# Patient Record
Sex: MALE | Race: WHITE | NOT HISPANIC OR LATINO | Employment: OTHER | URBAN - METROPOLITAN AREA
[De-identification: names, ages, dates, MRNs, and addresses within clinical notes are randomized per-mention and may not be internally consistent; named-entity substitution may affect disease eponyms.]

---

## 2023-05-25 ENCOUNTER — APPOINTMENT (OUTPATIENT)
Dept: GENERAL RADIOLOGY | Facility: HOSPITAL | Age: 58
End: 2023-05-25
Payer: MEDICAID

## 2023-05-25 ENCOUNTER — HOSPITAL ENCOUNTER (EMERGENCY)
Facility: HOSPITAL | Age: 58
Discharge: HOME | End: 2023-05-25
Attending: EMERGENCY MEDICINE
Payer: MEDICAID

## 2023-05-25 VITALS
DIASTOLIC BLOOD PRESSURE: 90 MMHG | SYSTOLIC BLOOD PRESSURE: 147 MMHG | RESPIRATION RATE: 18 BRPM | HEIGHT: 71 IN | WEIGHT: 270 LBS | TEMPERATURE: 98.5 F | HEART RATE: 86 BPM | BODY MASS INDEX: 37.8 KG/M2 | OXYGEN SATURATION: 97 %

## 2023-05-25 DIAGNOSIS — L72.9 CUTANEOUS CYST: ICD-10-CM

## 2023-05-25 DIAGNOSIS — M25.561 RIGHT KNEE PAIN, UNSPECIFIED CHRONICITY: Primary | ICD-10-CM

## 2023-05-25 PROCEDURE — 99282 EMERGENCY DEPT VISIT SF MDM: CPT

## 2023-05-25 PROCEDURE — 73562 X-RAY EXAM OF KNEE 3: CPT

## 2023-05-25 RX ORDER — ACETAMINOPHEN AND CODEINE PHOSPHATE 300; 30 MG/1; MG/1
1 TABLET ORAL EVERY 4 HOURS PRN
Qty: 12 TABLET | Refills: 0 | Status: SHIPPED | OUTPATIENT
Start: 2023-05-25

## 2023-05-25 NOTE — ED PROVIDER NOTES
Subjective   History of Present Illness  Chief Complaint: painful skin lesion on knee    Patient is a 58-year-old  male who complains of right knee pain.  Patient reports lesion that has been growing on his right knee for the past 6 months, patient reports pain upon kneeling on the right knee and or manipulating the lesion.  Patient reports no pain without manipulation of the lesion.  Patient denies any joint swelling.    PCP: Bharat Cedeño      History provided by:  Patient      Review of Systems   Constitutional: Negative for chills and fever.   HENT: Negative for sore throat and trouble swallowing.    Eyes: Negative.    Respiratory: Negative for shortness of breath and wheezing.    Cardiovascular: Negative for chest pain.   Gastrointestinal: Negative for abdominal pain, diarrhea, nausea and vomiting.   Endocrine: Negative.    Genitourinary: Negative for dysuria.   Musculoskeletal: Positive for arthralgias. Negative for myalgias.   Skin: Negative for rash.        Lesion   Allergic/Immunologic: Negative.    Neurological: Negative for weakness and headaches.   Psychiatric/Behavioral: Negative for behavioral problems.   All other systems reviewed and are negative.      No past medical history on file.    No Known Allergies    No past surgical history on file.    No family history on file.    Social History     Socioeconomic History   • Marital status:            Objective   Physical Exam  Vitals and nursing note reviewed.   Constitutional:       Appearance: He is obese.   HENT:      Head: Normocephalic and atraumatic.   Eyes:      Extraocular Movements: Extraocular movements intact.      Pupils: Pupils are equal, round, and reactive to light.   Cardiovascular:      Rate and Rhythm: Normal rate and regular rhythm.      Pulses: Normal pulses.      Heart sounds: Normal heart sounds. No murmur heard.  Pulmonary:      Effort: Pulmonary effort is normal.      Breath sounds: Normal breath sounds.  "  Musculoskeletal:         General: Tenderness present. No swelling or deformity. Normal range of motion.      Right lower leg: No edema.      Left lower leg: No edema.      Comments: Negative anterior drawer sign, negative Marian's  Negative Leo  Pedal pulses present 2+ bilaterally   Skin:     General: Skin is warm.      Capillary Refill: Capillary refill takes less than 2 seconds.      Findings: No erythema.      Comments: Small 1 x 1 cm cystlike lesion on the anterior aspect of the right knee.  Freely mobile   Neurological:      General: No focal deficit present.      Mental Status: He is alert and oriented to person, place, and time.      Cranial Nerves: No cranial nerve deficit.   Psychiatric:         Mood and Affect: Mood normal.         Behavior: Behavior normal.         Procedures           ED Course    /90   Pulse 86   Temp 98.5 °F (36.9 °C)   Resp 18   Ht 180.3 cm (71\")   Wt 122 kg (270 lb)   SpO2 97%   BMI 37.66 kg/m²   Labs Reviewed - No data to display  Medications - No data to display  XR Knee 3 View Right    Result Date: 5/25/2023  Impression: Tricompartmental arthritic change greatest in the medial compartment. Anterior skin thickening. No foreign body. No acute fracture. Electronically Signed: Priyank Green  5/25/2023 12:39 PM EDT  Workstation ID: [id number]                                           Medical Decision Making  Differential Dx (Includes but not limited to): Fracture contusion, pleural effusion, abscess, cyst, arthritis  Medical Records Reviewed: No pertinent records to review  Labs: N/A  Imaging: On my interpretation no obvious fracture or dislocation  Telemetry: N/A  Testing considered but not ordered: MRI, there is no significant instability or neurodeficit  Nature of Complaint: Acute  Admission vs Discharge: Discharge  Discussion: While in the ED appropriate PPE was worn during exam and throughout all encounters with the patient.  Patient had the above evaluation.  " Patient reports swelling and lesion to the knee for last few months, states that when he kneels on it today it became more painful.  On physical exam lesion appears consistent with a cutaneous cyst, it is round, freely mobile beneath the skin with no overlying erythema to suggest infection.  X-ray shows severe tricompartmental arthritic changes greatest in the medial compartment.  Findings discussed with patient at bedside.  Patient requested lesion to be opened and removed, I advised him that we do not do this in the ER and that he would have to see dermatology for this.  Patient referred to Ortho and dermatology for further evaluation.  Patient offered pain medication while in the ER and he declined.  Encouraged NSAIDs, Tylenol ibuprofen ice pack as needed for additional pain relief.  Patient agreeable with this plan    Discharge plan and instructions were discussed with the patient who verbalized understanding and is in agreement with the plan, all questions were answered at this time.  Patient is aware of signs symptoms that would require immediate return to the emergency room.  Patient understands importance of following up with primary care provider for further evaluation and worsening concerns as well as blood pressure recheck in the next 4 weeks.    Patient was discharged in improved stable condition with an upright steady gait.    Patient is aware that discharge does not mean that nothing is wrong but indicates no emergencies present and they must continue care with follow-up as given below or physician of her choice.    Cutaneous cyst: acute illness or injury  Right knee pain, unspecified chronicity: acute illness or injury  Amount and/or Complexity of Data Reviewed  Radiology: ordered.      Risk  Prescription drug management.          Final diagnoses:   Right knee pain, unspecified chronicity   Cutaneous cyst       ED Disposition  ED Disposition     ED Disposition   Discharge    Condition   Stable     Comment   --             Bharat Souza MD  [address] [facility] [place] [address]  [phone]    Schedule an appointment as soon as possible for a visit in 2 days  As needed, If symptoms worsen    Rafael Baig II, MD  [address] [facility] [place] [address]  [phone]    Schedule an appointment as soon as possible for a visit in 2 days  As needed, If symptoms worsen    Mario Fay Jr., MD  [address] [facility] [place] [address]  [phone]    Schedule an appointment as soon as possible for a visit in 2 days  As needed, If symptoms worsen         Medication List      New Prescriptions    acetaminophen-codeine 300-30 MG per tablet  Commonly known as: TYLENOL with CODEINE #3  Take 1 tablet by mouth Every 4 (Four) Hours As Needed for Moderate Pain.           Where to Get Your Medications      These medications were sent to [facility]/pharmacy #[id number] - [place], [place] - [address] [facility] - [phone] [place] [phone] FX  [address] [facility] [place] [address]    Phone: [phone]   · acetaminophen-codeine 300-30 MG per tablet          Breonna Awan PA  05/27/23 0870